# Patient Record
Sex: MALE | Race: BLACK OR AFRICAN AMERICAN | NOT HISPANIC OR LATINO | Employment: STUDENT | URBAN - METROPOLITAN AREA
[De-identification: names, ages, dates, MRNs, and addresses within clinical notes are randomized per-mention and may not be internally consistent; named-entity substitution may affect disease eponyms.]

---

## 2019-08-10 ENCOUNTER — HOSPITAL ENCOUNTER (EMERGENCY)
Facility: HOSPITAL | Age: 24
Discharge: HOME | End: 2019-08-10
Attending: EMERGENCY MEDICINE | Admitting: EMERGENCY MEDICINE
Payer: COMMERCIAL

## 2019-08-10 ENCOUNTER — APPOINTMENT (EMERGENCY)
Dept: RADIOLOGY | Facility: HOSPITAL | Age: 24
End: 2019-08-10
Attending: EMERGENCY MEDICINE
Payer: COMMERCIAL

## 2019-08-10 VITALS
HEIGHT: 71 IN | TEMPERATURE: 97.7 F | SYSTOLIC BLOOD PRESSURE: 138 MMHG | OXYGEN SATURATION: 98 % | WEIGHT: 185 LBS | HEART RATE: 68 BPM | BODY MASS INDEX: 25.9 KG/M2 | DIASTOLIC BLOOD PRESSURE: 88 MMHG | RESPIRATION RATE: 18 BRPM

## 2019-08-10 DIAGNOSIS — S06.0X0A CONCUSSION WITHOUT LOSS OF CONSCIOUSNESS, INITIAL ENCOUNTER: Primary | ICD-10-CM

## 2019-08-10 PROCEDURE — 99284 EMERGENCY DEPT VISIT MOD MDM: CPT

## 2019-08-10 PROCEDURE — 99284 EMERGENCY DEPT VISIT MOD MDM: CPT | Performed by: EMERGENCY MEDICINE

## 2019-08-10 PROCEDURE — 70450 CT HEAD/BRAIN W/O DYE: CPT

## 2019-08-10 RX ORDER — ONDANSETRON 2 MG/ML
1 INJECTION INTRAMUSCULAR; INTRAVENOUS ONCE
Status: COMPLETED | OUTPATIENT
Start: 2019-08-10 | End: 2019-08-10

## 2019-08-10 RX ORDER — ACETAMINOPHEN 325 MG/1
650 TABLET ORAL ONCE
Status: COMPLETED | OUTPATIENT
Start: 2019-08-10 | End: 2019-08-10

## 2019-08-10 RX ADMIN — ACETAMINOPHEN 650 MG: 325 TABLET ORAL at 15:47

## 2019-08-10 NOTE — DISCHARGE INSTRUCTIONS
You came to the emergency department because you hit your head  We performed a CT scan of your head which showed no bleeding or fractures  Your symptoms are likely due to a concussion  You should remain on "brain rest" for the next 24-48 hours  This means limiting screen time and limiting activities that require a lot of mental activity  Refrain from contact sports until your symptoms are fully resolved  We also recommend that someone stays with you for the next 24 hours  This person should routinely awaken you and ensure that you are not confused or difficult to wake up  Follow up with your primary care doctor or sports medicine if your symptoms do not improve  Please return to the emergency department if you develop confusion, severe headache, are difficult to awaken, or anything else concerning to you

## 2019-08-10 NOTE — ED ATTENDING ATTESTATION
Hawk Ballard DO, saw and evaluated the patient  I have discussed the patient with the resident/non-physician practitioner and agree with the resident's/non-physician practitioner's findings, Plan of Care, and MDM as documented in the resident's/non-physician practitioner's note, except where noted  All available labs and Radiology studies were reviewed  I was present for key portions of any procedure(s) performed by the resident/non-physician practitioner and I was immediately available to provide assistance  At this point I agree with the current assessment done in the Emergency Department  I have conducted an independent evaluation of this patient a history and physical is as follows:    44-year-old male patient, about noon today was playing Frisbee when he was diving, hit the ground, unsure if he hit his head on the Frisbee or the ground  He felt somewhat lightheaded, did not lose consciousness, played 1 more point of ultimate [employer] however then began feeling more nauseous and had 3 episodes of nonbloody, nonbilious emesis  An ambulance was called and he is now feeling better but still has a mild headache  He had no do blurred vision, no double vision, no difficulty moving the arms or legs, no pain in the arms or legs  General:  Patient is well-appearing  Head:  Atraumatic  Eyes:  Conjunctiva pink, Extraocular muscle intact, no periorbital ecchymosis  ENT:  Mucous members are moist, no dental malocclusion, no craniofacial instability, no Rivera signs  Neck:  Supple, no tenderness or step-offs or deformities  Cardiac:  S1-S2, without murmurs, no chest wall tenderness  Lungs:  Clear to auscultation bilaterally  Abdomen:  Soft, nontender, normal bowel sounds, no CVA tenderness, no tympany, no rigidity, no guarding  Extremities:  No bony tenderness to the bilateral bilateral humeral heads, humerus, elbows, radius, ulna, hands, hips, femurs, knees, tibia, fibula, feet   No pain with passive range of motion at the bilateral shoulders, elbows, wrists, hips, knees, or ankles  No midline cervical, thoracic, lumbar, sacral tenderness, deformities, or step-offs  Neurologic:  Awake, fluent speech, normal comprehension, awake and oriented to person, place, time and event  No deficit on finger to nose testing, no pronator drift, cranial nerves II through XII are intact, no facial droop, no slurred speech, normal sensation, strength 5/5 in b/l upper & lower extremities    Skin:  Pink warm and dry  Psychiatric:  Alert, pleasant, cooperative    Noncontrast head CT was reported by Radiology and reviewed by me to show no acute pathology  Patient appears to have mild concussion symptoms  No evidence of acute intracranial injury  I discussed supportive care, importance of follow-up and return precautions  Patient expressed understanding          Critical Care Time  Procedures

## 2019-08-10 NOTE — ED PROVIDER NOTES
History  Chief Complaint   Patient presents with    Head Injury     Patient was playing [employer], unsure if he hit his head on frisbee or hit the ground  Played 1 more point and felt dizzy and lightheaded  Reports vomiting 3x  no LOC      23 YOM with no PMH who presents with headache, lightheadedness, vomiting, after a fall  He reports he was playing [employer] and dove, at which time he landed on his right side and struck his head against the ground  He did not lose consciousness  He got up and continued playing, after which he developed a R sided headache  He sat down to rest at which time he also developed nausea and lightheadedness  He proceeded to have 3 episodes of non-bloody, non-bilious vomiting  At this time, he has a mild headache  All other symptoms have resolved  He denies any other injuries  He denies blurred vision, numbness/tingling, weakness, abdominal pain  None       History reviewed  No pertinent past medical history  History reviewed  No pertinent surgical history  History reviewed  No pertinent family history  I have reviewed and agree with the history as documented  Social History     Tobacco Use    Smoking status: Never Smoker    Smokeless tobacco: Never Used   Substance Use Topics    Alcohol use: Yes    Drug use: Never        Review of Systems   HENT: Negative for dental problem, ear discharge, ear pain, nosebleeds and rhinorrhea  Eyes: Negative for photophobia, pain and visual disturbance  Respiratory: Negative for cough, chest tightness and shortness of breath  Cardiovascular: Negative for chest pain  Gastrointestinal: Positive for nausea and vomiting  Negative for abdominal distention and abdominal pain  Musculoskeletal: Negative for back pain and neck pain  Skin: Negative for pallor and wound  Neurological: Positive for light-headedness and headaches  Negative for dizziness, seizures, syncope, weakness and numbness     All other systems reviewed and are negative  Physical Exam  ED Triage Vitals   Temperature Pulse Respirations Blood Pressure SpO2   08/10/19 1349 08/10/19 1347 08/10/19 1347 08/10/19 1347 08/10/19 1347   97 7 °F (36 5 °C) 64 18 148/85 98 %      Temp Source Heart Rate Source Patient Position - Orthostatic VS BP Location FiO2 (%)   08/10/19 1347 08/10/19 1347 08/10/19 1347 08/10/19 1347 --   Oral Monitor Sitting Right arm       Pain Score       08/10/19 1347       7             Orthostatic Vital Signs  Vitals:    08/10/19 1347 08/10/19 1450   BP: 148/85 138/88   Pulse: 64 68   Patient Position - Orthostatic VS: Sitting Lying       Physical Exam   Constitutional: He is oriented to person, place, and time  He appears well-developed and well-nourished  No distress  HENT:   Head: Normocephalic and atraumatic  Head is without raccoon's eyes, without Rivera's sign, without abrasion, without contusion and without laceration  Right Ear: Hearing and tympanic membrane normal  No mastoid tenderness  No hemotympanum  Left Ear: Hearing and tympanic membrane normal  No mastoid tenderness  No hemotympanum  Nose: No rhinorrhea, nose lacerations, sinus tenderness or nasal deformity  Eyes: Pupils are equal, round, and reactive to light  EOM are normal    Neck: Normal range of motion  Neck supple  No spinous process tenderness and no muscular tenderness present  Normal range of motion present  Cardiovascular: Normal rate and regular rhythm  Exam reveals no gallop and no friction rub  No murmur heard  Pulmonary/Chest: Effort normal and breath sounds normal  He has no wheezes  He has no rales  Abdominal: Soft  Bowel sounds are normal  He exhibits no distension  There is no tenderness  Musculoskeletal: Normal range of motion  He exhibits no edema, tenderness or deformity  Full ROM in all extremities without pain  No joint swelling or deformities  Neurological: He is alert and oriented to person, place, and time   No cranial nerve deficit or sensory deficit  He exhibits normal muscle tone  Coordination normal    Skin: Skin is warm and dry  No abrasions, lacerations, contusions noted  Psychiatric: He has a normal mood and affect  His behavior is normal    Nursing note and vitals reviewed  ED Medications  Medications   ondansetron (FOR EMS ONLY) (ZOFRAN) 4 mg/2 mL injection 4 mg (0 mg Does not apply Given to EMS 8/10/19 1419)   acetaminophen (TYLENOL) tablet 650 mg (650 mg Oral Given 8/10/19 1547)       Diagnostic Studies  Results Reviewed     None                 CT head without contrast   Final Result by Marija Álvarez MD (08/10 1500)      No acute intracranial abnormality  Workstation performed: [id number]               Procedures  Procedures        ED Course  ED Course as of Aug 10 2045   Sat Aug 10, 2019   1536 CT head without contrast                               MDM  Number of Diagnoses or Management Options  Concussion without loss of consciousness, initial encounter: new and requires workup  Diagnosis management comments: 26 YOM with head injury without LOC  Head CT performed given history of multiple episodes of vomiting  No acute hemorrhage or fracture  Given tylenol for headache  Advised brain rest for 24-48 hours & follow up  Return precautions given  I reviewed all testing with the patient: head CT  I gave oral return precautions for what to return for in addition to the written return precautions  The patient verbalized understanding of the discharge instructions and warnings that would necessitate return to the Emergency Department  I specifically highlighted areas of special concern regarding the written and verbal discharge instructions and return precautions  All questions were answered prior to discharge         Amount and/or Complexity of Data Reviewed  Tests in the radiology section of CPT®: ordered and reviewed  Decide to obtain previous medical records or to obtain history from someone other than the patient: yes  Review and summarize past medical records: yes  Discuss the patient with other providers: yes    Risk of Complications, Morbidity, and/or Mortality  Presenting problems: low  Diagnostic procedures: low  Management options: low    Patient Progress  Patient progress: stable      Disposition  Final diagnoses:   Concussion without loss of consciousness, initial encounter     Time reflects when diagnosis was documented in both MDM as applicable and the Disposition within this note     Time User Action Codes Description Comment    8/10/2019  3:37 PM Tomie Estimable Add [[id number]] Concussion without loss of consciousness, initial encounter       ED Disposition     ED Disposition Condition Date/Time Comment    Discharge Stable Sat Aug 10, 2019  3:37 PM [facility] discharge to home/self care  Follow-up Information     Follow up With Specialties Details Why Contact Info Additional Information    [employer]  Call  As needed, If symptoms worsen [address] [place] [address]  Call  As needed, If symptoms worsen [facility]  [phone] [facility], [address] [place] [address], [place], [place], [facility]          There are no discharge medications for this patient  No discharge procedures on file  ED Provider  Attending physically available and evaluated [facility]  I managed the patient along with the ED Attending      Electronically Signed by         Steph Jesus MD  08/10/19 7918